# Patient Record
Sex: FEMALE | Race: BLACK OR AFRICAN AMERICAN | NOT HISPANIC OR LATINO
[De-identification: names, ages, dates, MRNs, and addresses within clinical notes are randomized per-mention and may not be internally consistent; named-entity substitution may affect disease eponyms.]

---

## 2019-08-15 ENCOUNTER — RESULT REVIEW (OUTPATIENT)
Age: 59
End: 2019-08-15

## 2019-10-11 ENCOUNTER — RESULT REVIEW (OUTPATIENT)
Age: 59
End: 2019-10-11

## 2021-09-02 ENCOUNTER — RESULT REVIEW (OUTPATIENT)
Age: 61
End: 2021-09-02

## 2021-10-13 ENCOUNTER — OUTPATIENT (OUTPATIENT)
Dept: OUTPATIENT SERVICES | Facility: HOSPITAL | Age: 61
LOS: 1 days | End: 2021-10-13
Payer: COMMERCIAL

## 2021-10-13 ENCOUNTER — EMERGENCY (EMERGENCY)
Facility: HOSPITAL | Age: 61
LOS: 1 days | Discharge: HOME | End: 2021-10-13
Attending: EMERGENCY MEDICINE
Payer: COMMERCIAL

## 2021-10-13 VITALS
OXYGEN SATURATION: 100 % | HEIGHT: 64 IN | HEART RATE: 60 BPM | SYSTOLIC BLOOD PRESSURE: 205 MMHG | DIASTOLIC BLOOD PRESSURE: 116 MMHG | TEMPERATURE: 98 F | WEIGHT: 158.51 LBS | RESPIRATION RATE: 18 BRPM

## 2021-10-13 VITALS — HEART RATE: 69 BPM | DIASTOLIC BLOOD PRESSURE: 98 MMHG | SYSTOLIC BLOOD PRESSURE: 173 MMHG

## 2021-10-13 VITALS
OXYGEN SATURATION: 98 % | WEIGHT: 158.07 LBS | HEIGHT: 64 IN | TEMPERATURE: 98 F | HEART RATE: 64 BPM | RESPIRATION RATE: 16 BRPM | SYSTOLIC BLOOD PRESSURE: 186 MMHG | DIASTOLIC BLOOD PRESSURE: 94 MMHG

## 2021-10-13 DIAGNOSIS — Z98.890 OTHER SPECIFIED POSTPROCEDURAL STATES: Chronic | ICD-10-CM

## 2021-10-13 DIAGNOSIS — D05.12 INTRADUCTAL CARCINOMA IN SITU OF LEFT BREAST: ICD-10-CM

## 2021-10-13 DIAGNOSIS — I10 ESSENTIAL (PRIMARY) HYPERTENSION: ICD-10-CM

## 2021-10-13 DIAGNOSIS — E11.9 TYPE 2 DIABETES MELLITUS WITHOUT COMPLICATIONS: ICD-10-CM

## 2021-10-13 DIAGNOSIS — Z01.818 ENCOUNTER FOR OTHER PREPROCEDURAL EXAMINATION: ICD-10-CM

## 2021-10-13 LAB
ANION GAP SERPL CALC-SCNC: 5 MMOL/L — SIGNIFICANT CHANGE UP (ref 5–17)
APTT BLD: 31.8 SEC — SIGNIFICANT CHANGE UP (ref 27.5–35.5)
BLD GP AB SCN SERPL QL: SIGNIFICANT CHANGE UP
BUN SERPL-MCNC: 15 MG/DL — SIGNIFICANT CHANGE UP (ref 7–18)
CALCIUM SERPL-MCNC: 9.9 MG/DL — SIGNIFICANT CHANGE UP (ref 8.4–10.5)
CHLORIDE SERPL-SCNC: 107 MMOL/L — SIGNIFICANT CHANGE UP (ref 96–108)
CO2 SERPL-SCNC: 29 MMOL/L — SIGNIFICANT CHANGE UP (ref 22–31)
CREAT SERPL-MCNC: 1.13 MG/DL — SIGNIFICANT CHANGE UP (ref 0.5–1.3)
GLUCOSE SERPL-MCNC: 94 MG/DL — SIGNIFICANT CHANGE UP (ref 70–99)
HCT VFR BLD CALC: 39.6 % — SIGNIFICANT CHANGE UP (ref 34.5–45)
HGB BLD-MCNC: 12.6 G/DL — SIGNIFICANT CHANGE UP (ref 11.5–15.5)
INR BLD: 0.98 RATIO — SIGNIFICANT CHANGE UP (ref 0.88–1.16)
MCHC RBC-ENTMCNC: 30.8 PG — SIGNIFICANT CHANGE UP (ref 27–34)
MCHC RBC-ENTMCNC: 31.8 GM/DL — LOW (ref 32–36)
MCV RBC AUTO: 96.8 FL — SIGNIFICANT CHANGE UP (ref 80–100)
NRBC # BLD: 0 /100 WBCS — SIGNIFICANT CHANGE UP (ref 0–0)
PLATELET # BLD AUTO: 321 K/UL — SIGNIFICANT CHANGE UP (ref 150–400)
POTASSIUM SERPL-MCNC: 3.9 MMOL/L — SIGNIFICANT CHANGE UP (ref 3.5–5.3)
POTASSIUM SERPL-SCNC: 3.9 MMOL/L — SIGNIFICANT CHANGE UP (ref 3.5–5.3)
PROTHROM AB SERPL-ACNC: 11.7 SEC — SIGNIFICANT CHANGE UP (ref 10.6–13.6)
RBC # BLD: 4.09 M/UL — SIGNIFICANT CHANGE UP (ref 3.8–5.2)
RBC # FLD: 11.9 % — SIGNIFICANT CHANGE UP (ref 10.3–14.5)
SODIUM SERPL-SCNC: 141 MMOL/L — SIGNIFICANT CHANGE UP (ref 135–145)
WBC # BLD: 5.08 K/UL — SIGNIFICANT CHANGE UP (ref 3.8–10.5)
WBC # FLD AUTO: 5.08 K/UL — SIGNIFICANT CHANGE UP (ref 3.8–10.5)

## 2021-10-13 PROCEDURE — G0463: CPT

## 2021-10-13 PROCEDURE — 93005 ELECTROCARDIOGRAM TRACING: CPT

## 2021-10-13 PROCEDURE — 93010 ELECTROCARDIOGRAM REPORT: CPT

## 2021-10-13 PROCEDURE — 99283 EMERGENCY DEPT VISIT LOW MDM: CPT

## 2021-10-13 PROCEDURE — 99283 EMERGENCY DEPT VISIT LOW MDM: CPT | Mod: 25

## 2021-10-13 RX ORDER — LOSARTAN POTASSIUM 100 MG/1
25 TABLET, FILM COATED ORAL ONCE
Refills: 0 | Status: COMPLETED | OUTPATIENT
Start: 2021-10-13 | End: 2021-10-13

## 2021-10-13 RX ORDER — SODIUM CHLORIDE 9 MG/ML
3 INJECTION INTRAMUSCULAR; INTRAVENOUS; SUBCUTANEOUS EVERY 8 HOURS
Refills: 0 | Status: DISCONTINUED | OUTPATIENT
Start: 2021-10-20 | End: 2021-10-20

## 2021-10-13 RX ORDER — HYDRALAZINE HCL 50 MG
10 TABLET ORAL ONCE
Refills: 0 | Status: COMPLETED | OUTPATIENT
Start: 2021-10-13 | End: 2021-10-13

## 2021-10-13 RX ADMIN — Medication 10 MILLIGRAM(S): at 21:00

## 2021-10-13 RX ADMIN — LOSARTAN POTASSIUM 25 MILLIGRAM(S): 100 TABLET, FILM COATED ORAL at 21:00

## 2021-10-13 NOTE — ED PROVIDER NOTE - NSFOLLOWUPINSTRUCTIONS_ED_ALL_ED_FT
CHRONIC HYPERTENSION - AfterCare(R) Instructions(ER/ED)           Chronic Hypertension    WHAT YOU NEED TO KNOW:    Hypertension is high blood pressure. Your blood pressure is the force of your blood moving against the walls of your arteries. Hypertension causes your blood pressure to get so high that your heart has to work much harder than normal. This can damage your heart. Even if you have hypertension for years, lifestyle changes, medicines, or both can help bring your blood pressure to normal.    DISCHARGE INSTRUCTIONS:    Call your local emergency number (911 in the [place]) or have someone call if:   •You have chest pain.      •You have any of the following signs of a heart attack: ?Squeezing, pressure, or pain in your chest      ?You may also have any of the following: ?Discomfort or pain in your back, neck, jaw, stomach, or arm      ?Shortness of breath      ?Nausea or vomiting      ?Lightheadedness or a sudden cold sweat        •You become confused or have difficulty speaking.      •You suddenly feel lightheaded or have trouble breathing.      Return to the emergency department if:   •You have a severe headache or vision loss.      •You have weakness in an arm or leg.      Call your doctor or cardiologist if:   •You feel faint, dizzy, confused, or drowsy.      •You have been taking your blood pressure medicine but your pressure is higher than your provider says it should be.      •You have questions or concerns about your condition or care.      Medicines: You may need any of the following:  •Antihypertensives may be used to help lower your blood pressure. Several kinds of medicines are available. Your healthcare provider may change the medicine or medicines you currently take. This may be needed if your blood pressure is often high when you check it at home or you are having other problems with blood pressure control.      •Diuretics help decrease extra fluid that collects in your body. This will help lower your BP. You may urinate more often while you take this medicine.      •Cholesterol medicine helps lower your cholesterol level. A low cholesterol level helps prevent heart disease and makes it easier to control your blood pressure.      •Take your medicine as directed. Contact your healthcare provider if you think your medicine is not helping or if you have side effects. Tell him or her if you are allergic to any medicine. Keep a list of the medicines, vitamins, and herbs you take. Include the amounts, and when and why you take them. Bring the list or the pill bottles to follow-up visits. Carry your medicine list with you in case of an emergency.      Stages of hypertension:     Blood Pressure Readings     •Normal blood pressure is 119/79 or lower. Your healthcare provider may only check your blood pressure each year if it stays at a normal level.      •Elevated blood pressure is 120/79 to 129/79. This is sometimes called prehypertension. Your healthcare provider may suggest lifestyle changes to help lower your blood pressure to a normal level. He or she may then check it again in 3 to 6 months.      •Stage 1 hypertension is 130/80 to 139/89. Your provider may recommend lifestyle changes, medication, and checks every 3 to 6 months until your blood pressure is controlled.      •Stage 2 hypertension is 140/90 or higher. Your provider will recommend lifestyle changes and have you take 2 kinds of hypertension medicines. You will also need to have your blood pressure checked monthly until it is controlled.      Manage chronic hypertension:   •Check your blood pressure at home. Avoid smoking, caffeine, and exercise at least 30 minutes before checking your blood pressure. Sit and rest for 5 minutes before you take your blood pressure. Extend your arm and support it on a flat surface. Your arm should be at the same level as your heart. Follow the directions that came with your blood pressure monitor. Check your blood pressure 2 times, 1 minute apart, before you take your medicine in the morning. Also check your blood pressure before your evening meal. Keep a record of your readings and bring it to your follow-up visits. Ask your healthcare provider what your blood pressure should be.  How to take a Blood Pressure           •Manage any other health conditions you have. Health conditions such as diabetes can increase your risk for hypertension. Follow your healthcare provider's instructions and take all your medicines as directed. Talk to your healthcare provider about any new health conditions you have recently developed.      •Ask about all medicines. Certain medicines can increase your blood pressure. Examples include oral birth control pills, decongestants, herbal supplements, and NSAIDs, such as ibuprofen. Your healthcare provider can tell you which medicines are safe for you to take. This includes prescription and over-the-counter medicines.      Lifestyle changes you can make to lower your blood pressure: Your provider may want you to make more lifestyle changes if you are having trouble controlling your blood pressure. This may feel difficult over time, especially if you think you are making good changes but your pressure is still high. It might help to focus on one new change at a time. For example, try to add 1 more day of exercise, or exercise for an extra 10 minutes on 2 days. Small changes can make a big difference. Your healthcare provider can also refer you to specialists such as a dietitian who can help you make small changes. Your family members may be included in helping you learn to create lifestyle changes, such as the following:    Ways to Lower Your Blood Pressure     •Limit sodium (salt) as directed. Too much sodium can affect your fluid balance. Check labels to find low-sodium or no-salt-added foods. Some low-sodium foods use potassium salts for flavor. Too much potassium can also cause health problems. Your healthcare provider will tell you how much sodium and potassium are safe for you to have in a day. He or she may recommend that you limit sodium to 2,300 mg a day.             •Follow the meal plan recommended by your healthcare provider. A dietitian or your provider can give you more information on low-sodium plans or the DASH (Dietary Approaches to Stop Hypertension) eating plan. The DASH plan is low in sodium, processed sugar, unhealthy fats, and total fat. It is high in potassium, calcium, and fiber. These can be found in vegetables, fruit, and whole-grain foods.             •Be physically active throughout the day. Physical activity, such as exercise, can help control your blood pressure and your weight. Be physically active for at least 30 minutes per day, on most days of the week. Include aerobic activity, such as walking or riding a bicycle. Also include strength training at least 2 times each week. Your healthcare providers can help you create a physical activity plan.   FAMILY WALKING FOR EXERCISE       Strength Training for Adults           •Decrease stress. This may help lower your blood pressure. Learn ways to relax, such as deep breathing or listening to music.      •Limit alcohol as directed. Alcohol can increase your blood pressure. A drink of alcohol is 12 ounces of beer, 5 ounces of wine, or 1½ ounces of liquor.      •Do not smoke. Nicotine and other chemicals in cigarettes and cigars can increase your blood pressure and also cause lung damage. Ask your healthcare provider for information if you currently smoke and need help to quit. E-cigarettes or smokeless tobacco still contain nicotine. Talk to your healthcare provider before you use these products.  Prevent Heart Disease            Follow up with your doctor or cardiologist as directed: You will need to return to have your blood pressure checked and to have other lab tests done. Write down your questions so you remember to ask them during your visits.       © Copyright [employer] 2021           back to top                          © Copyright [employer] 2021

## 2021-10-13 NOTE — H&P PST ADULT - PROBLEM SELECTOR PLAN 3
Patient instructed with understanding verbalized to skip glimepiride the day of surgery.   Follow up with PCP postoperatively.   Fingerstick STAT AM day of surgery ordered

## 2021-10-13 NOTE — H&P PST ADULT - NSICDXPASTMEDICALHX_GEN_ALL_CORE_FT
PAST MEDICAL HISTORY:  Cataract     Hyperlipidemia     Hypertension     Left frontal lobe mass     Type 2 diabetes mellitus

## 2021-10-13 NOTE — ED PROVIDER NOTE - PROGRESS NOTE DETAILS
BP now 170/88. Denies symptoms. Will dc with PCP fu. Discussed indications for patient return to ED. Patient understood.

## 2021-10-13 NOTE — H&P PST ADULT - NSANTHOSAYNRD_GEN_A_CORE
No. GUILLERMO screening performed.  STOP BANG Legend: 0-2 = LOW Risk; 3-4 = INTERMEDIATE Risk; 5-8 = HIGH Risk

## 2021-10-13 NOTE — ED PROVIDER NOTE - PHYSICAL EXAMINATION
GENERAL: well appearing, no acute distress   HEAD: atraumatic   EYES: EOMI, pink conjunctiva   ENT: moist oral mucosa   CARDIAC: RRR, no edema, distal pulses present   RESPIRATORY: lungs CTAB, no increased work of breathing   GASTROINTESTINAL: no abdominal tenderness, no rebound or guarding, bowel sounds presents  GENITOURINARY: no CVA tenderness   MUSCULOSKELETAL: no deformity   NEUROLOGICAL: AAOx3, CN's II-XII intact, strength 5/5 bilateral UE and LE, sensation intact to light touch, finger to nose intact, steady gait  SKIN: intact   PSYCHIATRIC: cooperative  HEME LYMPH: no lymphadenopathy
complains of pain/discomfort

## 2021-10-13 NOTE — H&P PST ADULT - HISTORY OF PRESENT ILLNESS
61year old female with pmhx of hypertension, hyperlipidemia, T2DM, cataract and frontal lobe benign mass presents with c/o abnormal mammogram revealing left breast mass/lump confirmed to be intraductal carcinoma bi biopsy. Patient is here today for presurgical testing for scheduled left total mastectomy, sentinel lymph node biopsy, right prophylactic mastectomy on 10/20/2021

## 2021-10-13 NOTE — ED ADULT TRIAGE NOTE - CHIEF COMPLAINT QUOTE
My blood pressure is high.  I have a scheduled bilateral mastectomy on 10/20/21.  Patient has a history of hypertension and is compliant with prescribed medications.

## 2021-10-13 NOTE — H&P PST ADULT - EKG AND INTERPRETATION
EKG done today - normal sinus rhythm  Moderate voltage for LVH, maybe normal variant  Borderline ECG

## 2021-10-13 NOTE — ED PROVIDER NOTE - OBJECTIVE STATEMENT
62 yo F pmh of HTN (on amlodipine, losartan, labetalol), breast mass, presents from pre surgical testing with elevated BP. Denies any symptoms. Reports compliance with meds.

## 2021-10-13 NOTE — ED PROVIDER NOTE - PATIENT PORTAL LINK FT
You can access the FollowMyHealth Patient Portal offered by [facility] by registering at the following website: [email or web address]. By joining [employer]’s FollowMyHealth portal, you will also be able to view your health information using other applications (apps) compatible with our system.

## 2021-10-13 NOTE — H&P PST ADULT - PROBLEM SELECTOR PLAN 1
Patient scheduled for left total mastectomy, sentinel lymph node biopsy, right prophylactic mastectomy on 10/20/2021  - Written and oral preoperative instructions given to patient with understanding verbalized.   - Instructions given to include using 4% chlorhexidine wash day of surgery.   - Maintaining NPO status post midnight day before surgery  - Stopping aspirin, NSAIDs, herbs, vitamins 7days before surgery   - Patient is to expect a phone call day before surgery between the hours of 430- 630pm giving arrival time for surgery day.    Patient given copy of EKG for PCP medical preoperative optimization appointment on 10/14/2021.

## 2021-10-13 NOTE — ED ADULT NURSE NOTE - NSIMPLEMENTINTERV_GEN_ALL_ED
Implemented All Universal Safety Interventions:  [place] to call system. Call bell, personal items and telephone within reach. Instruct patient to call for assistance. Room bathroom lighting operational. Non-slip footwear when patient is off stretcher. Physically safe environment: no spills, clutter or unnecessary equipment. Stretcher in lowest position, wheels locked, appropriate side rails in place.

## 2021-10-13 NOTE — H&P PST ADULT - PSYCHIATRIC DETAILS
Related to outcome of surgery and spreading cancer metastasis/anxious Related to outcome of surgery and fear of cancer metastasis/anxious

## 2021-10-13 NOTE — H&P PST ADULT - NSICDXFAMILYHX_GEN_ALL_CORE_FT
FAMILY HISTORY:  Father  Still living? Yes, Estimated age: Age Unknown  Family history of hypertension, Age at diagnosis: Age Unknown  Family history of stroke, Age at diagnosis: Age Unknown    Mother  Still living? No  Family history of liver cancer, Age at diagnosis: Age Unknown

## 2021-10-13 NOTE — H&P PST ADULT - PROBLEM SELECTOR PLAN 2
Patient instructed with understanding verbalized to take labetalol, amlodipine and losartan with a sip of water the morning of surgery. Follow up with PCP postoperatively Patient instructed with understanding verbalized to take labetalol, amlodipine and losartan with a sip of water the morning of surgery. Follow up with PCP postoperatively    BP today - L arm 201/88, R arm 191/100  Manually R arm 186/94 L arm 198/113  Patient escorted to ED for further evaluation

## 2021-10-17 ENCOUNTER — LABORATORY RESULT (OUTPATIENT)
Age: 61
End: 2021-10-17

## 2021-10-18 ENCOUNTER — APPOINTMENT (OUTPATIENT)
Dept: DISASTER EMERGENCY | Facility: CLINIC | Age: 61
End: 2021-10-18

## 2021-10-19 ENCOUNTER — TRANSCRIPTION ENCOUNTER (OUTPATIENT)
Age: 61
End: 2021-10-19

## 2021-10-19 PROBLEM — Z00.00 ENCOUNTER FOR PREVENTIVE HEALTH EXAMINATION: Status: ACTIVE | Noted: 2021-10-19

## 2021-10-20 ENCOUNTER — INPATIENT (INPATIENT)
Facility: HOSPITAL | Age: 61
LOS: 1 days | Discharge: HOME HEALTH | DRG: 581 | End: 2021-10-22
Attending: SURGERY | Admitting: SURGERY
Payer: COMMERCIAL

## 2021-10-20 ENCOUNTER — RESULT REVIEW (OUTPATIENT)
Age: 61
End: 2021-10-20

## 2021-10-20 ENCOUNTER — TRANSCRIPTION ENCOUNTER (OUTPATIENT)
Age: 61
End: 2021-10-20

## 2021-10-20 VITALS
HEART RATE: 68 BPM | OXYGEN SATURATION: 100 % | SYSTOLIC BLOOD PRESSURE: 124 MMHG | WEIGHT: 158.07 LBS | RESPIRATION RATE: 16 BRPM | DIASTOLIC BLOOD PRESSURE: 78 MMHG | HEIGHT: 64 IN | TEMPERATURE: 98 F

## 2021-10-20 DIAGNOSIS — Z01.818 ENCOUNTER FOR OTHER PREPROCEDURAL EXAMINATION: ICD-10-CM

## 2021-10-20 DIAGNOSIS — Z98.890 OTHER SPECIFIED POSTPROCEDURAL STATES: Chronic | ICD-10-CM

## 2021-10-20 DIAGNOSIS — D05.12 INTRADUCTAL CARCINOMA IN SITU OF LEFT BREAST: ICD-10-CM

## 2021-10-20 LAB
BLD GP AB SCN SERPL QL: SIGNIFICANT CHANGE UP
GLUCOSE BLDC GLUCOMTR-MCNC: 118 MG/DL — HIGH (ref 70–99)
GLUCOSE BLDC GLUCOMTR-MCNC: 134 MG/DL — HIGH (ref 70–99)
GLUCOSE BLDC GLUCOMTR-MCNC: 144 MG/DL — HIGH (ref 70–99)

## 2021-10-20 PROCEDURE — 88334 PATH CONSLTJ SURG CYTO XM EA: CPT | Mod: 26,59

## 2021-10-20 PROCEDURE — 88331 PATH CONSLTJ SURG 1 BLK 1SPC: CPT | Mod: 26

## 2021-10-20 PROCEDURE — 88342 IMHCHEM/IMCYTCHM 1ST ANTB: CPT | Mod: 26

## 2021-10-20 PROCEDURE — 78195 LYMPH SYSTEM IMAGING: CPT | Mod: 26,MG

## 2021-10-20 PROCEDURE — G1004: CPT

## 2021-10-20 PROCEDURE — 88307 TISSUE EXAM BY PATHOLOGIST: CPT | Mod: 26

## 2021-10-20 PROCEDURE — 88341 IMHCHEM/IMCYTCHM EA ADD ANTB: CPT | Mod: 26

## 2021-10-20 RX ORDER — ACETAMINOPHEN 500 MG
650 TABLET ORAL EVERY 6 HOURS
Refills: 0 | Status: DISCONTINUED | OUTPATIENT
Start: 2021-10-20 | End: 2021-10-22

## 2021-10-20 RX ORDER — OXYCODONE HYDROCHLORIDE 5 MG/1
5 TABLET ORAL EVERY 4 HOURS
Refills: 0 | Status: DISCONTINUED | OUTPATIENT
Start: 2021-10-20 | End: 2021-10-22

## 2021-10-20 RX ORDER — INSULIN LISPRO 100/ML
VIAL (ML) SUBCUTANEOUS
Refills: 0 | Status: DISCONTINUED | OUTPATIENT
Start: 2021-10-20 | End: 2021-10-22

## 2021-10-20 RX ORDER — IBUPROFEN 200 MG
400 TABLET ORAL EVERY 6 HOURS
Refills: 0 | Status: DISCONTINUED | OUTPATIENT
Start: 2021-10-20 | End: 2021-10-22

## 2021-10-20 RX ORDER — FENTANYL CITRATE 50 UG/ML
50 INJECTION INTRAVENOUS
Refills: 0 | Status: DISCONTINUED | OUTPATIENT
Start: 2021-10-20 | End: 2021-10-20

## 2021-10-20 RX ORDER — DEXTROSE 50 % IN WATER 50 %
25 SYRINGE (ML) INTRAVENOUS ONCE
Refills: 0 | Status: DISCONTINUED | OUTPATIENT
Start: 2021-10-20 | End: 2021-10-22

## 2021-10-20 RX ORDER — FENTANYL CITRATE 50 UG/ML
25 INJECTION INTRAVENOUS
Refills: 0 | Status: DISCONTINUED | OUTPATIENT
Start: 2021-10-20 | End: 2021-10-20

## 2021-10-20 RX ORDER — ONDANSETRON 8 MG/1
4 TABLET, FILM COATED ORAL EVERY 6 HOURS
Refills: 0 | Status: DISCONTINUED | OUTPATIENT
Start: 2021-10-20 | End: 2021-10-22

## 2021-10-20 RX ORDER — SODIUM CHLORIDE 9 MG/ML
1000 INJECTION, SOLUTION INTRAVENOUS
Refills: 0 | Status: DISCONTINUED | OUTPATIENT
Start: 2021-10-20 | End: 2021-10-20

## 2021-10-20 RX ORDER — SODIUM CHLORIDE 9 MG/ML
1000 INJECTION, SOLUTION INTRAVENOUS
Refills: 0 | Status: DISCONTINUED | OUTPATIENT
Start: 2021-10-20 | End: 2021-10-22

## 2021-10-20 RX ORDER — ONDANSETRON 8 MG/1
4 TABLET, FILM COATED ORAL ONCE
Refills: 0 | Status: DISCONTINUED | OUTPATIENT
Start: 2021-10-20 | End: 2021-10-20

## 2021-10-20 RX ORDER — KETOTIFEN FUMARATE 0.34 MG/ML
SOLUTION OPHTHALMIC
Refills: 0 | Status: DISCONTINUED | OUTPATIENT
Start: 2021-10-20 | End: 2021-10-22

## 2021-10-20 RX ORDER — LORATADINE 10 MG/1
10 TABLET ORAL DAILY
Refills: 0 | Status: DISCONTINUED | OUTPATIENT
Start: 2021-10-20 | End: 2021-10-22

## 2021-10-20 RX ORDER — AMLODIPINE BESYLATE 2.5 MG/1
5 TABLET ORAL DAILY
Refills: 0 | Status: DISCONTINUED | OUTPATIENT
Start: 2021-10-20 | End: 2021-10-22

## 2021-10-20 RX ORDER — LABETALOL HCL 100 MG
300 TABLET ORAL
Refills: 0 | Status: DISCONTINUED | OUTPATIENT
Start: 2021-10-20 | End: 2021-10-22

## 2021-10-20 RX ORDER — LOSARTAN POTASSIUM 100 MG/1
100 TABLET, FILM COATED ORAL DAILY
Refills: 0 | Status: DISCONTINUED | OUTPATIENT
Start: 2021-10-20 | End: 2021-10-22

## 2021-10-20 RX ORDER — DEXTROSE 50 % IN WATER 50 %
15 SYRINGE (ML) INTRAVENOUS ONCE
Refills: 0 | Status: DISCONTINUED | OUTPATIENT
Start: 2021-10-20 | End: 2021-10-22

## 2021-10-20 RX ORDER — PREDNISOLONE SODIUM PHOSPHATE 1 %
1 DROPS OPHTHALMIC (EYE) EVERY 4 HOURS
Refills: 0 | Status: DISCONTINUED | OUTPATIENT
Start: 2021-10-20 | End: 2021-10-22

## 2021-10-20 RX ORDER — KETOTIFEN FUMARATE 0.34 MG/ML
1 SOLUTION OPHTHALMIC ONCE
Refills: 0 | Status: COMPLETED | OUTPATIENT
Start: 2021-10-20 | End: 2021-10-20

## 2021-10-20 RX ORDER — ATORVASTATIN CALCIUM 80 MG/1
40 TABLET, FILM COATED ORAL AT BEDTIME
Refills: 0 | Status: DISCONTINUED | OUTPATIENT
Start: 2021-10-20 | End: 2021-10-22

## 2021-10-20 RX ORDER — KETOTIFEN FUMARATE 0.34 MG/ML
1 SOLUTION OPHTHALMIC
Refills: 0 | Status: DISCONTINUED | OUTPATIENT
Start: 2021-10-21 | End: 2021-10-22

## 2021-10-20 RX ORDER — GLUCAGON INJECTION, SOLUTION 0.5 MG/.1ML
1 INJECTION, SOLUTION SUBCUTANEOUS ONCE
Refills: 0 | Status: DISCONTINUED | OUTPATIENT
Start: 2021-10-20 | End: 2021-10-22

## 2021-10-20 RX ORDER — DEXTROSE 50 % IN WATER 50 %
12.5 SYRINGE (ML) INTRAVENOUS ONCE
Refills: 0 | Status: DISCONTINUED | OUTPATIENT
Start: 2021-10-20 | End: 2021-10-22

## 2021-10-20 RX ADMIN — KETOTIFEN FUMARATE 1 DROP(S): 0.34 SOLUTION OPHTHALMIC at 18:42

## 2021-10-20 RX ADMIN — Medication 1 DROP(S): at 21:28

## 2021-10-20 RX ADMIN — Medication 1 DROP(S): at 18:42

## 2021-10-20 RX ADMIN — Medication 300 MILLIGRAM(S): at 18:42

## 2021-10-20 RX ADMIN — ATORVASTATIN CALCIUM 40 MILLIGRAM(S): 80 TABLET, FILM COATED ORAL at 21:38

## 2021-10-20 NOTE — DISCHARGE NOTE PROVIDER - NSDCMRMEDTOKEN_GEN_ALL_CORE_FT
amLODIPine 5 mg oral tablet: 1 tab(s) orally once a day  atorvastatin 40 mg oral tablet: 1 tab(s) orally once a day  azelastine 0.05% ophthalmic solution: 1 drop(s) to each affected eye 2 times a day  desloratadine 5 mg oral tablet: 1 tab(s) orally once a day  glimepiride 2 mg oral tablet: 1 tab(s) orally once a day  labetalol 300 mg oral tablet: 1 tab(s) orally 2 times a day  losartan 100 mg oral tablet: 1 tab(s) orally once a day  meloxicam 15 mg oral tablet: 1 tab(s) orally once a day  Prednisol 1% ophthalmic solution: 1 drop(s) to each affected eye every 4 hours  triamcinolone 0.1% topical lotion: Apply topically to affected area 3 times a day   amLODIPine 5 mg oral tablet: 1 tab(s) orally once a day  atorvastatin 40 mg oral tablet: 1 tab(s) orally once a day  azelastine 0.05% ophthalmic solution: 1 drop(s) to each affected eye 2 times a day  desloratadine 5 mg oral tablet: 1 tab(s) orally once a day  glimepiride 2 mg oral tablet: 1 tab(s) orally once a day  labetalol 300 mg oral tablet: 1 tab(s) orally 2 times a day  meloxicam 15 mg oral tablet: 1 tab(s) orally once a day  oxycodone-acetaminophen 5 mg-325 mg oral tablet: 1 tab(s) orally every 8 hours, As Needed -for severe pain MDD:max 4 tabs a day  Prednisol 1% ophthalmic solution: 1 drop(s) to each affected eye every 4 hours  triamcinolone 0.1% topical lotion: Apply topically to affected area 3 times a day

## 2021-10-20 NOTE — DISCHARGE NOTE PROVIDER - NSDCFUADDINST_GEN_ALL_CORE_FT
Shower allowed after 48 hours from the surgery     General Discharge Instructions:  Please resume all regular home medications unless specifically advised not to take a particular medication. Also, please take any new medications as prescribed.  Please get plenty of rest, continue to ambulate several times per day, and drink adequate amounts of fluids. Avoid lifting weights greater than 5-10 lbs until you follow-up with your surgeon, who will instruct you further regarding activity restrictions.  Avoid driving or operating heavy machinery while taking pain medications.  Please follow-up with your surgeon and Primary Care Provider (PCP).  Incision Care:  *Please call your doctor or nurse practitioner if you have increased pain, swelling, redness, or drainage from the incision site.  *Avoid swimming and baths until your follow-up appointment.  *You may shower, and wash surgical incisions with a mild soap and warm water. Gently pat the area dry. Never scrub the incisions.  *You have steri-strips, they will fall off on their own    Warning Signs:  Please call your doctor or nurse practitioner if you experience the following:  *You experience new chest pain, pressure, squeezing or tightness.  *New or worsening cough, shortness of breath, or wheeze.  *If you are vomiting and cannot keep down fluids or your medications.  *You are getting dehydrated due to continued vomiting, diarrhea, or other reasons. Signs of dehydration include dry mouth, rapid heartbeat, or feeling dizzy or faint when standing.  *You see blood or dark/black material when you vomit or have a bowel movement.  *You experience burning when you urinate, have blood in your urine, or experience a discharge.  *Your pain is not improving within 8-12 hours or is not gone within 24 hours. Call or return immediately if your pain is getting worse, changes location, or moves to your chest or back.  *You have shaking chills, or fever greater than 101.5 degrees Fahrenheit or 38 degrees Celsius.  *Any change in your symptoms, or any new symptoms that concern you.    WILLIE Drain Care:  *Please look at the site every day for signs of infection (increased redness or pain, swelling, odor, yellow or bloody discharge, warm to touch, fever).  *Maintain suction of the bulb.  *Note color, consistency, and amount of fluid in the drain. Call the doctor, nurse practitioner, or VNA nurse if the amount increases significantly or changes in character.  *Be sure to empty the drain frequently. Record the output daily and bring the record with you to your follow up visit.   *You may shower; wash the area gently with warm, soapy water.  *Keep the insertion site clean and dry otherwise.  *Avoid swimming, baths, hot tubs; do not submerge yourself in water.  *Make sure to keep the drain attached securely to your body to prevent pulling or dislocation.

## 2021-10-20 NOTE — DISCHARGE NOTE PROVIDER - CARE PROVIDER_API CALL
Mumtaz Matson)  ColonRectal Surgery; Surgery  [address] [place] [address]  Phone: [phone]  Fax: [phone]  Established Patient  Follow Up Time: 1 week

## 2021-10-20 NOTE — BRIEF OPERATIVE NOTE - NSICDXBRIEFPROCEDURE_GEN_ALL_CORE_FT
PROCEDURES:  Mastectomy, left, simple 20-Oct-2021 15:23:43  Michael Meehan  Lymph node excision 20-Oct-2021 15:24:26  Michael Meehan  Right simple mastectomy 20-Oct-2021 15:24:37  Michael Meehan

## 2021-10-20 NOTE — DISCHARGE NOTE PROVIDER - HOSPITAL COURSE
This is a 61 years old female, presenting for Bilateral simple mastectomy with left axillary lymph node biopsy for CDIS- biopsy negative. The procedure was uncomplicated and well tolerated by the patient. The post-operative course was uncomplicated. Diet was advanced as tolerated , and pain was well controlled on medication. On day of discharge, patient had stable vital signs, was tolerating diet, voiding without assistance, and pain was controlled. The patient is to follow up in 1 weeks. WILLIE care instructions provided

## 2021-10-20 NOTE — BRIEF OPERATIVE NOTE - NSICDXBRIEFPREOP_GEN_ALL_CORE_FT
PRE-OP DIAGNOSIS:  Ductal carcinoma in situ (DCIS) of left breast 20-Oct-2021 15:24:53  Michael Meehan

## 2021-10-20 NOTE — BRIEF OPERATIVE NOTE - NSICDXBRIEFPOSTOP_GEN_ALL_CORE_FT
POST-OP DIAGNOSIS:  Ductal carcinoma in situ (DCIS) of left breast 20-Oct-2021 15:25:05  Michael Meehan

## 2021-10-20 NOTE — CHART NOTE - NSCHARTNOTEFT_GEN_A_CORE
POST-OPERATIVE NOTE    Procedure: Bilateral single mastectomy with left axillary lymph node biopsy     Diagnosis/Indication: DCIS    S: Patient reports feeling no pain, tolerated the  diet, Denies numbness, pain, or motor changes to b/l upper extremities  Denies CP, SOB, HANNAH, calf tenderness. Pain controlled with medication.    T(C): 37.3 (10-20-21 @ 17:15), Max: 37.3 (10-20-21 @ 17:15)  T(F): 99.2 (10-20-21 @ 17:15), Max: 99.2 (10-20-21 @ 17:15)  HR: 76 (10-20-21 @ 17:15) (64 - 81)  BP: 151/82 (10-20-21 @ 17:15) (132/85 - 160/93)  RR: 18 (10-20-21 @ 17:15) (12 - 18)  SpO2: 100% (10-20-21 @ 17:15) (99% - 100%)  Wt(kg): --      Gen: resting comfortably in bed, alert  C/V: Normal sinus rhythm per monito  Pulm: Nonlabored breathing, no respiratory distress, no crepitation.   CHEST: s/p bilateral mastectomy with clan dressing, axilla soft, WILLIE drain x4 with seroussanguineous fluid  Extrem: WWP, no calf edema, SCDs in place    Plan:    61 years old female s/p  Bilateral single mastectomy with left axillary lymph node biopsy   REgular diet    PAin and nausea control  WILLIE drain care and teaching   Strict I&O  OOBA   DVT ppx and SCD

## 2021-10-21 LAB
A1C WITH ESTIMATED AVERAGE GLUCOSE RESULT: 6.6 % — HIGH (ref 4–5.6)
COVID-19 SPIKE DOMAIN AB INTERP: POSITIVE
COVID-19 SPIKE DOMAIN ANTIBODY RESULT: >250 U/ML — HIGH
ESTIMATED AVERAGE GLUCOSE: 143 MG/DL — HIGH (ref 68–114)
GLUCOSE BLDC GLUCOMTR-MCNC: 100 MG/DL — HIGH (ref 70–99)
GLUCOSE BLDC GLUCOMTR-MCNC: 120 MG/DL — HIGH (ref 70–99)
GLUCOSE BLDC GLUCOMTR-MCNC: 129 MG/DL — HIGH (ref 70–99)
HCT VFR BLD CALC: 27.3 % — LOW (ref 34.5–45)
HCT VFR BLD CALC: 28.2 % — LOW (ref 34.5–45)
HGB BLD-MCNC: 8.9 G/DL — LOW (ref 11.5–15.5)
HGB BLD-MCNC: 9.3 G/DL — LOW (ref 11.5–15.5)
MCHC RBC-ENTMCNC: 31.4 PG — SIGNIFICANT CHANGE UP (ref 27–34)
MCHC RBC-ENTMCNC: 32.1 PG — SIGNIFICANT CHANGE UP (ref 27–34)
MCHC RBC-ENTMCNC: 32.6 GM/DL — SIGNIFICANT CHANGE UP (ref 32–36)
MCHC RBC-ENTMCNC: 33 GM/DL — SIGNIFICANT CHANGE UP (ref 32–36)
MCV RBC AUTO: 96.5 FL — SIGNIFICANT CHANGE UP (ref 80–100)
MCV RBC AUTO: 97.2 FL — SIGNIFICANT CHANGE UP (ref 80–100)
NRBC # BLD: 0 /100 WBCS — SIGNIFICANT CHANGE UP (ref 0–0)
NRBC # BLD: 0 /100 WBCS — SIGNIFICANT CHANGE UP (ref 0–0)
PLATELET # BLD AUTO: 227 K/UL — SIGNIFICANT CHANGE UP (ref 150–400)
PLATELET # BLD AUTO: 243 K/UL — SIGNIFICANT CHANGE UP (ref 150–400)
RBC # BLD: 2.83 M/UL — LOW (ref 3.8–5.2)
RBC # BLD: 2.9 M/UL — LOW (ref 3.8–5.2)
RBC # FLD: 11.9 % — SIGNIFICANT CHANGE UP (ref 10.3–14.5)
RBC # FLD: 12.1 % — SIGNIFICANT CHANGE UP (ref 10.3–14.5)
SARS-COV-2 IGG+IGM SERPL QL IA: >250 U/ML — HIGH
SARS-COV-2 IGG+IGM SERPL QL IA: POSITIVE
WBC # BLD: 5.01 K/UL — SIGNIFICANT CHANGE UP (ref 3.8–10.5)
WBC # BLD: 5.55 K/UL — SIGNIFICANT CHANGE UP (ref 3.8–10.5)
WBC # FLD AUTO: 5.01 K/UL — SIGNIFICANT CHANGE UP (ref 3.8–10.5)
WBC # FLD AUTO: 5.55 K/UL — SIGNIFICANT CHANGE UP (ref 3.8–10.5)

## 2021-10-21 RX ORDER — INFLUENZA VIRUS VACCINE 15; 15; 15; 15 UG/.5ML; UG/.5ML; UG/.5ML; UG/.5ML
0.5 SUSPENSION INTRAMUSCULAR ONCE
Refills: 0 | Status: DISCONTINUED | OUTPATIENT
Start: 2021-10-21 | End: 2021-10-22

## 2021-10-21 RX ORDER — OXYCODONE AND ACETAMINOPHEN 5; 325 MG/1; MG/1
1 TABLET ORAL
Qty: 9 | Refills: 0
Start: 2021-10-21 | End: 2021-10-23

## 2021-10-21 RX ADMIN — Medication 300 MILLIGRAM(S): at 17:42

## 2021-10-21 RX ADMIN — KETOTIFEN FUMARATE 1 DROP(S): 0.34 SOLUTION OPHTHALMIC at 05:52

## 2021-10-21 RX ADMIN — Medication 1 DROP(S): at 21:16

## 2021-10-21 RX ADMIN — Medication 1 DROP(S): at 05:52

## 2021-10-21 RX ADMIN — LORATADINE 10 MILLIGRAM(S): 10 TABLET ORAL at 13:18

## 2021-10-21 RX ADMIN — LOSARTAN POTASSIUM 100 MILLIGRAM(S): 100 TABLET, FILM COATED ORAL at 05:53

## 2021-10-21 RX ADMIN — Medication 1 DROP(S): at 17:41

## 2021-10-21 RX ADMIN — Medication 400 MILLIGRAM(S): at 05:59

## 2021-10-21 RX ADMIN — Medication 400 MILLIGRAM(S): at 17:42

## 2021-10-21 RX ADMIN — KETOTIFEN FUMARATE 1 DROP(S): 0.34 SOLUTION OPHTHALMIC at 17:41

## 2021-10-21 RX ADMIN — Medication 400 MILLIGRAM(S): at 06:29

## 2021-10-21 RX ADMIN — Medication 1 DROP(S): at 13:18

## 2021-10-21 RX ADMIN — AMLODIPINE BESYLATE 5 MILLIGRAM(S): 2.5 TABLET ORAL at 05:52

## 2021-10-21 RX ADMIN — Medication 300 MILLIGRAM(S): at 05:53

## 2021-10-21 RX ADMIN — ATORVASTATIN CALCIUM 40 MILLIGRAM(S): 80 TABLET, FILM COATED ORAL at 21:16

## 2021-10-21 RX ADMIN — Medication 400 MILLIGRAM(S): at 18:50

## 2021-10-21 NOTE — PROGRESS NOTE ADULT - SUBJECTIVE AND OBJECTIVE BOX
SUBJECTIVE: Patient seen and examined bedside. Patient reports feeling well, besides some soreness, no nausea or vomit. tolerating the diet well.       amLODIPine   Tablet 5 milliGRAM(s) Oral daily  labetalol 300 milliGRAM(s) Oral two times a day  losartan 100 milliGRAM(s) Oral daily      Vital Signs Last 24 Hrs  T(C): 37.5 (21 Oct 2021 06:00), Max: 37.5 (21 Oct 2021 06:00)  T(F): 99.5 (21 Oct 2021 06:00), Max: 99.5 (21 Oct 2021 06:00)  HR: 58 (21 Oct 2021 06:00) (58 - 81)  BP: 141/67 (21 Oct 2021 06:00) (112/56 - 160/93)  BP(mean): 95 (20 Oct 2021 16:12) (95 - 109)  RR: 18 (21 Oct 2021 06:00) (12 - 18)  SpO2: 100% (21 Oct 2021 06:00) (95% - 100%)  I&O's Detail    20 Oct 2021 07:01  -  21 Oct 2021 07:00  --------------------------------------------------------  IN:  Total IN: 0 mL    OUT:    Drain (mL): 80 mL    Drain (mL): 190 mL    Drain (mL): 180 mL    Drain (mL): 280 mL  Total OUT: 730 mL    Total NET: -730 mL        Gen: resting comfortably in bed, alert  C/V: Normal sinus rhythm per monito  Pulm: Nonlabored breathing, no respiratory distress, no crepitation.   CHEST: s/p bilateral mastectomy with clan dressing, axilla soft, WILLIE drain x4 with seroussanguineous fluid  Extrem: WWP, no calf edema, SCDs in place      LABS:

## 2021-10-22 ENCOUNTER — TRANSCRIPTION ENCOUNTER (OUTPATIENT)
Age: 61
End: 2021-10-22

## 2021-10-22 VITALS
OXYGEN SATURATION: 100 % | HEART RATE: 72 BPM | RESPIRATION RATE: 18 BRPM | DIASTOLIC BLOOD PRESSURE: 80 MMHG | TEMPERATURE: 99 F | SYSTOLIC BLOOD PRESSURE: 125 MMHG

## 2021-10-22 LAB
GLUCOSE BLDC GLUCOMTR-MCNC: 121 MG/DL — HIGH (ref 70–99)
GLUCOSE BLDC GLUCOMTR-MCNC: 169 MG/DL — HIGH (ref 70–99)
HCT VFR BLD CALC: 28.5 % — LOW (ref 34.5–45)
HGB BLD-MCNC: 9.2 G/DL — LOW (ref 11.5–15.5)
MCHC RBC-ENTMCNC: 31.5 PG — SIGNIFICANT CHANGE UP (ref 27–34)
MCHC RBC-ENTMCNC: 32.3 GM/DL — SIGNIFICANT CHANGE UP (ref 32–36)
MCV RBC AUTO: 97.6 FL — SIGNIFICANT CHANGE UP (ref 80–100)
NRBC # BLD: 0 /100 WBCS — SIGNIFICANT CHANGE UP (ref 0–0)
PLATELET # BLD AUTO: 238 K/UL — SIGNIFICANT CHANGE UP (ref 150–400)
RBC # BLD: 2.92 M/UL — LOW (ref 3.8–5.2)
RBC # FLD: 11.9 % — SIGNIFICANT CHANGE UP (ref 10.3–14.5)
WBC # BLD: 4.8 K/UL — SIGNIFICANT CHANGE UP (ref 3.8–10.5)
WBC # FLD AUTO: 4.8 K/UL — SIGNIFICANT CHANGE UP (ref 3.8–10.5)

## 2021-10-22 PROCEDURE — G1004: CPT

## 2021-10-22 PROCEDURE — 88333 PATH CONSLTJ SURG CYTO XM 1: CPT

## 2021-10-22 PROCEDURE — 88341 IMHCHEM/IMCYTCHM EA ADD ANTB: CPT

## 2021-10-22 PROCEDURE — 88331 PATH CONSLTJ SURG 1 BLK 1SPC: CPT

## 2021-10-22 PROCEDURE — 85027 COMPLETE CBC AUTOMATED: CPT

## 2021-10-22 PROCEDURE — 36415 COLL VENOUS BLD VENIPUNCTURE: CPT

## 2021-10-22 PROCEDURE — 82962 GLUCOSE BLOOD TEST: CPT

## 2021-10-22 PROCEDURE — 86901 BLOOD TYPING SEROLOGIC RH(D): CPT

## 2021-10-22 PROCEDURE — 88342 IMHCHEM/IMCYTCHM 1ST ANTB: CPT

## 2021-10-22 PROCEDURE — 86769 SARS-COV-2 COVID-19 ANTIBODY: CPT

## 2021-10-22 PROCEDURE — 88307 TISSUE EXAM BY PATHOLOGIST: CPT

## 2021-10-22 PROCEDURE — 86900 BLOOD TYPING SEROLOGIC ABO: CPT

## 2021-10-22 PROCEDURE — 86850 RBC ANTIBODY SCREEN: CPT

## 2021-10-22 PROCEDURE — 83036 HEMOGLOBIN GLYCOSYLATED A1C: CPT

## 2021-10-22 PROCEDURE — A9541: CPT

## 2021-10-22 PROCEDURE — 78195 LYMPH SYSTEM IMAGING: CPT | Mod: MG

## 2021-10-22 RX ADMIN — Medication 300 MILLIGRAM(S): at 05:41

## 2021-10-22 RX ADMIN — Medication 1 DROP(S): at 05:40

## 2021-10-22 RX ADMIN — Medication 1 DROP(S): at 11:57

## 2021-10-22 RX ADMIN — LORATADINE 10 MILLIGRAM(S): 10 TABLET ORAL at 12:49

## 2021-10-22 RX ADMIN — AMLODIPINE BESYLATE 5 MILLIGRAM(S): 2.5 TABLET ORAL at 05:41

## 2021-10-22 RX ADMIN — Medication 2: at 11:56

## 2021-10-22 RX ADMIN — KETOTIFEN FUMARATE 1 DROP(S): 0.34 SOLUTION OPHTHALMIC at 05:40

## 2021-10-22 RX ADMIN — LOSARTAN POTASSIUM 100 MILLIGRAM(S): 100 TABLET, FILM COATED ORAL at 05:41

## 2021-10-22 RX ADMIN — Medication 400 MILLIGRAM(S): at 01:49

## 2021-10-22 NOTE — DISCHARGE NOTE NURSING/CASE MANAGEMENT/SOCIAL WORK - PATIENT PORTAL LINK FT
You can access the FollowMyHealth Patient Portal offered by [facility] by registering at the following website: [email or web address]. By joining [facility]’s FollowMyHealth portal, you will also be able to view your health information using other applications (apps) compatible with our system.

## 2021-10-22 NOTE — PROGRESS NOTE ADULT - ASSESSMENT
61 years old female s/p  Bilateral single mastectomy with left axillary lymph node biopsy   REgular diet    PAin and nausea control  WILLIE drain care and teaching   Strict I&O  OOBA   DVT ppx and SCD.    Discharge home today after WILLIE teaching by the nursing staff  Medication sent to pharmacy   attending made aware and agrees with the plan
61 years old female s/p  Bilateral single mastectomy with left axillary lymph node biopsy     Hg stable, drain output slightly decrease, serous drainage    Regular diet    PAin and nausea control  WILLIE drain care and teaching   Strict I&O  OOBA   DVT ppx and SCD.  Discharge home today after WILLIE teaching by the nursing staff  Medication sent to pharmacy   Attending made aware and agrees with the plan

## 2021-10-22 NOTE — PROGRESS NOTE ADULT - SUBJECTIVE AND OBJECTIVE BOX
SUBJECTIVE: Patient seen and examined bedside. Patient reports       amLODIPine   Tablet 5 milliGRAM(s) Oral daily  labetalol 300 milliGRAM(s) Oral two times a day  losartan 100 milliGRAM(s) Oral daily      Vital Signs Last 24 Hrs  T(C): 36.8 (22 Oct 2021 05:15), Max: 36.9 (21 Oct 2021 21:29)  T(F): 98.3 (22 Oct 2021 05:15), Max: 98.4 (21 Oct 2021 21:29)  HR: 65 (22 Oct 2021 05:15) (60 - 74)  BP: 132/75 (22 Oct 2021 05:15) (111/51 - 134/65)  BP(mean): --  RR: 16 (22 Oct 2021 05:15) (16 - 16)  SpO2: 100% (22 Oct 2021 05:15) (99% - 100%)  I&O's Detail    20 Oct 2021 07:01  -  21 Oct 2021 07:00  --------------------------------------------------------  IN:  Total IN: 0 mL    OUT:    Drain (mL): 80 mL    Drain (mL): 190 mL    Drain (mL): 180 mL    Drain (mL): 280 mL  Total OUT: 730 mL    Total NET: -730 mL      21 Oct 2021 07:01  -  22 Oct 2021 06:49  --------------------------------------------------------  IN:  Total IN: 0 mL    OUT:    Drain (mL): 85 mL    Drain (mL): 65 mL    Drain (mL): 100 mL    Drain (mL): 75 mL  Total OUT: 325 mL    Total NET: -325 mL          PHYSICAL EXAMINATION   General: NAD  NEURO:  follows commands.   PULM: nonlabored breathing, no respiratory distress  CHEST: s/p bilateral mastectomy with clan dressing, axilla soft, WILLIE drain x4 with seroussanguineous fluid  EXTREM: WWP, no edema, no calf tenderness  VASC: No cyanosis,  no pallor.   PSYCH: Appropriate affect, answers questions appropriately      LABS:                        9.2    4.80  )-----------( 238      ( 22 Oct 2021 06:23 )             28.5                 SUBJECTIVE: Patient seen and examined bedside. Patient reports soreness to the breast, some numbness to the chest, denies extremities weakness or numbness, or motor changes no nausea or vomit, tolerating diet.      amLODIPine   Tablet 5 milliGRAM(s) Oral daily  labetalol 300 milliGRAM(s) Oral two times a day  losartan 100 milliGRAM(s) Oral daily      Vital Signs Last 24 Hrs  T(C): 36.8 (22 Oct 2021 05:15), Max: 36.9 (21 Oct 2021 21:29)  T(F): 98.3 (22 Oct 2021 05:15), Max: 98.4 (21 Oct 2021 21:29)  HR: 65 (22 Oct 2021 05:15) (60 - 74)  BP: 132/75 (22 Oct 2021 05:15) (111/51 - 134/65)  BP(mean): --  RR: 16 (22 Oct 2021 05:15) (16 - 16)  SpO2: 100% (22 Oct 2021 05:15) (99% - 100%)  I&O's Detail    20 Oct 2021 07:01  -  21 Oct 2021 07:00  --------------------------------------------------------  IN:  Total IN: 0 mL    OUT:    Drain (mL): 80 mL    Drain (mL): 190 mL    Drain (mL): 180 mL    Drain (mL): 280 mL  Total OUT: 730 mL    Total NET: -730 mL      21 Oct 2021 07:01  -  22 Oct 2021 06:49  --------------------------------------------------------  IN:  Total IN: 0 mL    OUT:    Drain (mL): 85 mL    Drain (mL): 65 mL    Drain (mL): 100 mL    Drain (mL): 75 mL  Total OUT: 325 mL    Total NET: -325 mL          PHYSICAL EXAMINATION   General: NAD  NEURO:  follows commands.   PULM: nonlabored breathing, no respiratory distress  CHEST: s/p bilateral mastectomy with clean dressing, axilla soft, WILLIE drain x4 with serous-sanguineous fluid  EXTREM: WWP, no edema, no calf tenderness  VASC: No cyanosis, no pallor.   PSYCH: Appropriate affect, answers questions appropriately      LABS:                        9.2    4.80  )-----------( 238      ( 22 Oct 2021 06:23 )             28.5

## 2021-10-25 ENCOUNTER — TRANSCRIPTION ENCOUNTER (OUTPATIENT)
Age: 61
End: 2021-10-25

## 2021-10-26 LAB — SURGICAL PATHOLOGY STUDY: SIGNIFICANT CHANGE UP

## 2022-05-26 NOTE — DISCHARGE NOTE PROVIDER - NSDCCPCAREPLAN_GEN_ALL_CORE_FT
PRINCIPAL DISCHARGE DIAGNOSIS  Diagnosis: DCIS (ductal carcinoma in situ)  Assessment and Plan of Treatment: This is a 61 years old female, presenting for Bilateral simple mastectomy with left axillary lymph node biopsy for CDIS- biopsy negative. The procedure was uncomplicated and well tolerated by the patient. The post-operative course was uncomplicated. Diet was advanced as tolerated , and pain was well controlled on medication. On day of discharge, patient had stable vital signs, was tolerating diet, voiding without assistance, and pain was controlled. The patient is to follow up in 1 weeks. WILLIE care instructions provided       [FreeTextEntry1] : HTN-uncontrolled in office but controlled at home as per patient, machine was accurate when i checked in the past systolic was almost exact \par -Avoid salt\par -continue   valsartan to  320mg po daily \par -Continue to monitor BP at home \par -continue carvedilol 12.5mg po bid \par seen by cardiologist \par \par DM II bgms improving \par continue janumet  50-1000mg po bid\par will monitor renal function\par pt will see nephrologist for renal eval due to htn and DM II , normal BUN/Cr but slightly decreased gfr but stable \par \par HLD\par denies muscle pain on statin\par continue rosuvastatin 10mg po qhs \par \par pt will send me a portal message later w/ his bp when he is relaxed\par f/u 3mos or sooner if bp is rising or bgms are rising \par pt agreed w/plan

## 2022-05-26 NOTE — ED ADULT NURSE NOTE - PAIN RATING/NUMBER SCALE (0-10): ACTIVITY
0 [place] Pregnancy And Lactation Text: There is insufficient data to evaluate drug-associated risk for major birth defects, miscarriage, or other adverse maternal or fetal outcomes.  There is a pregnancy registry that monitors pregnancy outcomes in pregnant persons exposed to the medication during pregnancy.  It is unknown if this medication is excreted in breast milk.  Do not breastfeed during treatment and for about 4 weeks after the last dose.

## 2023-09-18 NOTE — ASU PATIENT PROFILE, ADULT - FALLEN IN THE PAST
You reported you would like to be discharged to [address] [facility], [address] [facility]   You confirmed that your cell phone number is [phone]          Douglas Mcdaniel or Wilda, our [address] [place] [address] Avenue, will be calling you after your discharge, on the phone number that you provided. They will be available as an additional support, if needed. If you wish to speak with one of them, you may contact Douglas Mcdaniel at [phone] or Jude Galicia at [phone]. no

## 2024-01-10 ENCOUNTER — EMERGENCY (EMERGENCY)
Facility: HOSPITAL | Age: 64
LOS: 0 days | Discharge: HOME | End: 2024-01-11
Attending: EMERGENCY MEDICINE
Payer: COMMERCIAL

## 2024-01-10 VITALS
SYSTOLIC BLOOD PRESSURE: 189 MMHG | TEMPERATURE: 98 F | RESPIRATION RATE: 18 BRPM | OXYGEN SATURATION: 99 % | HEART RATE: 75 BPM | HEIGHT: 67 IN | WEIGHT: 162.04 LBS | DIASTOLIC BLOOD PRESSURE: 97 MMHG
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
## 2024-01-10 DIAGNOSIS — S61.211A LACERATION WITHOUT FOREIGN BODY OF LEFT INDEX FINGER WITHOUT DAMAGE TO NAIL, INITIAL ENCOUNTER: ICD-10-CM

## 2024-01-10 DIAGNOSIS — Z98.890 OTHER SPECIFIED POSTPROCEDURAL STATES: Chronic | ICD-10-CM

## 2024-01-10 PROCEDURE — 12001 RPR S/N/AX/GEN/TRNK 2.5CM/<: CPT

## 2024-01-10 PROCEDURE — 99284 EMERGENCY DEPT VISIT MOD MDM: CPT | Mod: 25

## 2024-01-10 RX ORDER — ACETAMINOPHEN 500 MG
975 TABLET ORAL ONCE
Refills: 0 | Status: COMPLETED | OUTPATIENT
Start: 2024-01-10 | End: 2024-01-10

## 2024-01-10 RX ORDER — ACETAMINOPHEN 500 MG
2 TABLET ORAL
Qty: 40 | Refills: 0
Start: 2024-01-10 | End: 2024-01-14

## 2024-01-10 RX ADMIN — Medication 1 TABLET(S): at 23:58

## 2024-01-10 RX ADMIN — Medication 975 MILLIGRAM(S): at 23:58

## 2024-01-10 RX ADMIN — Medication 0.2 MILLIGRAM(S): at 23:59

## 2024-01-10 NOTE — ED PROVIDER NOTE - IV ALTEPLASE EXCL ABS HIDDEN
Chargeable pacer remote    Normal pacer function with >99% AF burden & >99% RV pacing. See scanned report in  for details.
show

## 2024-01-10 NOTE — ED PROCEDURE NOTE - LENGTH OF LACERATION
1.5 Spironolactone Counseling: Patient advised regarding risks of diarrhea, abdominal pain, hyperkalemia, birth defects (for female patients), liver toxicity and renal toxicity. The patient may need blood work to monitor liver and kidney function and potassium levels while on therapy. The patient verbalized understanding of the proper use and possible adverse effects of spironolactone.  All of the patient's questions and concerns were addressed.

## 2024-01-10 NOTE — ED ADULT TRIAGE NOTE - NS ED TRIAGE AVPU SCALE
back Alert-The patient is alert, awake and responds to voice. The patient is oriented to time, place, and person. The triage nurse is able to obtain subjective information.

## 2024-01-10 NOTE — ED PROVIDER NOTE - OBJECTIVE STATEMENT
64 yo F With laceration of L 2nd digit, sustained about 1 hour before coming to ER.  Pt. was descaling fish and knife slipped cutting L 2nd digit.  No other complaints or injury.  Pt. complains of local pain only, no radiation.  No other injuries.  Pt. denies numbness/weakness.  Pt. states that tetanus is up to date.   ROS: negative for fever, cough, headache, chest pain, shortness of breath, abd pain, nausea, vomiting, diarrhea, rash, paresthesia, and weakness--all other systems reviewed are negative.   PMH: HTN, NIDDM, HLD; Meds: labetalol, amlodipine, losartan, glimepiride; SH: Denies smoking/drinking/drug use

## 2024-01-10 NOTE — ED PROVIDER NOTE - CARE PROVIDER_API CALL
Gina Del Angel  Plastic Surgery  [address] [place] [address], [address]  [place] [address]  Phone: [phone]  Fax: [phone]  Follow Up Time: Urgent   Gina Del Angel  Plastic Surgery  [address] [place] [address], [address]  [place] [address]  Phone: [phone]  Fax: [phone]  Follow Up Time: Urgent

## 2024-01-10 NOTE — ED PROVIDER NOTE - CLINICAL SUMMARY MEDICAL DECISION MAKING FREE TEXT BOX
64 yo F with L 2nd digit finger laceration, appears uncomplicated, superficial, without active bleeding  -XR L 2nd digit although doubt FB or bony trauma  -Augmentin coverage given knife was being used to cut fish  -wash wound and repair  -d/c with hand f/u urgently, antbx, tylenol for pain  -tetanus up to date 62 yo F with L 2nd digit finger laceration, appears uncomplicated, superficial, without active bleeding  -XR L 2nd digit although doubt FB or bony trauma  -Augmentin coverage given knife was being used to cut fish  -wash wound and repair  -d/c with hand f/u urgently, antbx, tylenol for pain  -tetanus up to date

## 2024-01-10 NOTE — ED ADULT NURSE NOTE - NSFALLUNIVINTERV_ED_ALL_ED
Bed/Stretcher in lowest position, wheels locked, appropriate side rails in place/Call bell, personal items and telephone in reach/Instruct patient to call for assistance before getting out of bed/chair/stretcher/Non-slip footwear applied when patient is off stretcher/[place] to call system/Physically safe environment - no spills, clutter or unnecessary equipment/Purposeful proactive rounding/Room/bathroom lighting operational, light cord in reach Bed/Stretcher in lowest position, wheels locked, appropriate side rails in place/Call bell, personal items and telephone in reach/Instruct patient to call for assistance before getting out of bed/chair/stretcher/Non-slip footwear applied when patient is off stretcher/[place] to call system/Physically safe environment - no spills, clutter or unnecessary equipment/Purposeful proactive rounding/Room/bathroom lighting operational, light cord in reach

## 2024-01-10 NOTE — ED PROCEDURE NOTE - CPROC ED TIME OUT STATEMENT1
“Patient's name, , procedure and correct site were confirmed during the [facility] Timeout.” “Patient's name, , procedure and correct site were confirmed during the [facility] Timeout.”

## 2024-01-10 NOTE — ED ADULT TRIAGE NOTE - CHIEF COMPLAINT QUOTE
cut to L-2nd digit with straight kitchen knife.  up to date on tetanus laceration approx 1.5cm  to L-2nd digit with straight kitchen knife.  up to date on tetanus

## 2024-01-10 NOTE — ED PROVIDER NOTE - PHYSICAL EXAMINATION
Vitals: HTN at 189/97, otherwise WNL  Gen: AAOx3, NAD, sitting comfortably in stretcher  Head: ncat, perrla, eomi b/l  Neck: supple, no lymphadenopathy, no midline deviation  Heart: rrr, no m/r/g  Lungs: CTA b/l, no rales/ronchi/wheezes  Abd: soft, nontender, non-distended, no rebound or guarding  Ext: no clubbing/cyanosis/edema, + L 2nd digit lac at lateral aspect, over proximal phalange, about 1.5 cm in length, no active bleeding, finger has normal rom in all directions, normal cap refill, normal sensation throughout, no FB visible in wound, no tendon/muscle visible within wound   Neuro: sensation and muscle strength intact b/l, steady gait

## 2024-01-10 NOTE — ED PROVIDER NOTE - CHIEF COMPLAINT
The patient is a 63y Female complaining of lacerations.
Detail Level: Detailed
Depth Of Biopsy: dermis
Was A Bandage Applied: Yes
Size Of Lesion In Cm: 0
Biopsy Type: H and E
Biopsy Method: 15 blade
Anesthesia Type: 1% lidocaine with 1:100,000 epinephrine and a 1:3 solution of 8.4% sodium bicarbonate
Anesthesia Volume In Cc (Will Not Render If 0): 0.5
Hemostasis: Aluminum Chloride
Wound Care: Vaseline
Dressing: bandage
Destruction After The Procedure: No
Type Of Destruction Used: Curettage
Curettage Text: The wound bed was treated with curettage after the biopsy was performed.
Cryotherapy Text: The wound bed was treated with cryotherapy after the biopsy was performed.
Electrodesiccation Text: The wound bed was treated with electrodesiccation after the biopsy was performed.
Electrodesiccation And Curettage Text: The wound bed was treated with electrodesiccation and curettage after the biopsy was performed.
Silver Nitrate Text: The wound bed was treated with silver nitrate after the biopsy was performed.
Lab: -[id number]
Consent: Written consent was obtained and risks were reviewed including but not limited to scarring, infection, bleeding, scabbing, incomplete removal, nerve damage and allergy to anesthesia.
Post-Care Instructions: I reviewed with the patient in detail post-care instructions. Patient is to keep the biopsy site dry overnight, and then apply bacitracin twice daily until healed. Patient may apply hydrogen peroxide soaks to remove any crusting.
Notification Instructions: Patient will be notified of biopsy results. However, patient instructed to call the office if not contacted within 2 weeks.
Billing Type: Third-Party Bill
Information: Selecting Yes will display possible errors in your note based on the variables you have selected. This validation is only offered as a suggestion for you. PLEASE NOTE THAT THE VALIDATION TEXT WILL BE REMOVED WHEN YOU FINALIZE YOUR NOTE. IF YOU WANT TO FAX A PRELIMINARY NOTE YOU WILL NEED TO TOGGLE THIS TO 'NO' IF YOU DO NOT WANT IT IN YOUR FAXED NOTE.

## 2024-01-10 NOTE — ED PROVIDER NOTE - PROGRESS NOTE DETAILS
Results reported to patient--grossly benign, XR shows no FB or fracture  Pt. reports feeling better after meds  pt. agrees to f/u with primary care outpt., hand referral given for urgent f/u, pt. understands to have sutures removed in 1 week   pt. understands to return to ED if symptoms worsen; will d/c with Tylenol prn, Augmentin coverage to avoid infection

## 2024-01-11 VITALS
OXYGEN SATURATION: 97 % | RESPIRATION RATE: 18 BRPM | SYSTOLIC BLOOD PRESSURE: 160 MMHG | DIASTOLIC BLOOD PRESSURE: 76 MMHG | TEMPERATURE: 98 F | HEART RATE: 72 BPM

## 2024-01-11 PROCEDURE — 73140 X-RAY EXAM OF FINGER(S): CPT | Mod: 26,LT

## 2024-04-05 RX ORDER — AMLODIPINE BESYLATE 2.5 MG/1
1 TABLET ORAL
Qty: 0 | Refills: 0 | DISCHARGE

## 2024-04-05 RX ORDER — MELOXICAM 15 MG/1
1 TABLET ORAL
Qty: 0 | Refills: 0 | DISCHARGE

## 2024-04-05 RX ORDER — ATORVASTATIN CALCIUM 80 MG/1
1 TABLET, FILM COATED ORAL
Qty: 0 | Refills: 0 | DISCHARGE

## 2024-04-05 RX ORDER — DESLORATADINE 5 MG/1
1 TABLET, FILM COATED ORAL
Qty: 0 | Refills: 0 | DISCHARGE

## 2024-04-05 RX ORDER — PREDNISOLONE SODIUM PHOSPHATE 1 %
1 DROPS OPHTHALMIC (EYE)
Qty: 0 | Refills: 0 | DISCHARGE

## 2024-04-05 RX ORDER — GLIMEPIRIDE 1 MG
1 TABLET ORAL
Qty: 0 | Refills: 0 | DISCHARGE

## 2024-04-05 RX ORDER — LOSARTAN POTASSIUM 100 MG/1
1 TABLET, FILM COATED ORAL
Qty: 0 | Refills: 0 | DISCHARGE

## 2024-04-05 RX ORDER — AZELASTINE HCL 0.05 %
1 DROPS OPHTHALMIC (EYE)
Qty: 0 | Refills: 0 | DISCHARGE

## 2024-04-05 RX ORDER — LABETALOL HCL 100 MG
1 TABLET ORAL
Qty: 0 | Refills: 0 | DISCHARGE

## 2024-12-23 NOTE — ED ADULT NURSE NOTE - NSICDXFAMILYHX_GEN_ALL_CORE_FT
Orders:    Comprehensive Metabolic Panel; Future    Lipid Panel With LDL / HDL Ratio; Future     FAMILY HISTORY:  Father  Still living? Yes, Estimated age: Age Unknown  Family history of hypertension, Age at diagnosis: Age Unknown  Family history of stroke, Age at diagnosis: Age Unknown    Mother  Still living? No  Family history of liver cancer, Age at diagnosis: Age Unknown